# Patient Record
Sex: MALE | Race: WHITE | NOT HISPANIC OR LATINO
[De-identification: names, ages, dates, MRNs, and addresses within clinical notes are randomized per-mention and may not be internally consistent; named-entity substitution may affect disease eponyms.]

---

## 2024-06-12 ENCOUNTER — NON-APPOINTMENT (OUTPATIENT)
Age: 49
End: 2024-06-12

## 2024-06-14 PROBLEM — Z00.00 ENCOUNTER FOR PREVENTIVE HEALTH EXAMINATION: Status: ACTIVE | Noted: 2024-06-14

## 2024-06-17 ENCOUNTER — NON-APPOINTMENT (OUTPATIENT)
Age: 49
End: 2024-06-17

## 2024-06-21 ENCOUNTER — APPOINTMENT (OUTPATIENT)
Dept: ORTHOPEDIC SURGERY | Facility: CLINIC | Age: 49
End: 2024-06-21

## 2024-06-27 ENCOUNTER — TRANSCRIPTION ENCOUNTER (OUTPATIENT)
Age: 49
End: 2024-06-27

## 2024-07-01 ENCOUNTER — TRANSCRIPTION ENCOUNTER (OUTPATIENT)
Age: 49
End: 2024-07-01

## 2024-07-03 ENCOUNTER — APPOINTMENT (OUTPATIENT)
Dept: ORTHOPEDIC SURGERY | Facility: CLINIC | Age: 49
End: 2024-07-03
Payer: OTHER MISCELLANEOUS

## 2024-07-03 DIAGNOSIS — Z78.9 OTHER SPECIFIED HEALTH STATUS: ICD-10-CM

## 2024-07-03 DIAGNOSIS — S83.411A SPRAIN OF MEDIAL COLLATERAL LIGAMENT OF RIGHT KNEE, INITIAL ENCOUNTER: ICD-10-CM

## 2024-07-03 DIAGNOSIS — Z72.3 LACK OF PHYSICAL EXERCISE: ICD-10-CM

## 2024-07-03 PROCEDURE — 99203 OFFICE O/P NEW LOW 30 MIN: CPT

## 2024-07-24 ENCOUNTER — APPOINTMENT (OUTPATIENT)
Dept: ORTHOPEDIC SURGERY | Facility: CLINIC | Age: 49
End: 2024-07-24
Payer: OTHER MISCELLANEOUS

## 2024-07-24 DIAGNOSIS — S83.411A SPRAIN OF MEDIAL COLLATERAL LIGAMENT OF RIGHT KNEE, INITIAL ENCOUNTER: ICD-10-CM

## 2024-07-24 PROCEDURE — 99213 OFFICE O/P EST LOW 20 MIN: CPT

## 2024-07-24 NOTE — HISTORY OF PRESENT ILLNESS
[de-identified] :  Mr. Rubio is a 48 year old male who comes in for f/u evaluation for RIGHT knee injury that occurred June 4, 2024.  His exam was consistent with an MCL sprain. He states that his knee is feeling progressively better.  He has done about 4 sessions of physical therapy.  He has been working the last couple weeks but light duty to avoid aggravating the knee.  His knee feels fine walking straight ahead and biking.  He does get brief pains up to 5 out of 10 if he twists on the knee.  He has not been taking any medication.  No swelling or locking or buckling.

## 2024-07-24 NOTE — PHYSICAL EXAM
[LE] : Sensory: Intact in bilateral lower extremities [Normal RLE] : Right Lower Extremity: No scars, rashes, lesions, ulcers, skin intact [Normal LLE] : Left Lower Extremity: No scars, rashes, lesions, ulcers, skin intact [Normal Touch] : sensation intact for touch [Normal] : Oriented to person, place, and time, insight and judgement were intact and the affect was normal [de-identified] : RIGHT knee Normal gait.  He can squat almost fully but the right knee does not flex to quite as much as the left and he has some discomfort on deep flexion No edema, ecchymoses, erythema, effusion. No deformity.  Mild varus of the knees Knee range of motion is from 0 to 130  degrees in the right knee versus 140 degrees left knee with some tightness on the right on full flexion and discomfort Minimally tender along the MCL.  No joint line tenderness or patella tenderness Ia Lachman.  Negative anterior and posterior drawer.  Normal varus laxity.  No increased valgus laxity but has medial pain with valgus stress.  Normal rotational laxity. Intact extensor mechanism. Negative Gage. Normal neurovascular exam [de-identified] :  XR KNEE COMPLETE 4 VIEWS RIGHT 6/21/2024  Clinical History: Pain AP, lateral and oblique view of the right knee nonweightbearing FINDINGS: There is no fracture, dislocation, soft tissue swelling or joint effusion. Mild narrowing of the medial joint space. Small enthesopathy superior pole of patella No radiopaque foreign body. IMPRESSION: 1. No fracture. There is patella baja.  Mild joint space narrowing without any significant productive changes.  No merchant view is present.

## 2024-07-24 NOTE — PHYSICAL EXAM
[LE] : Sensory: Intact in bilateral lower extremities [Normal RLE] : Right Lower Extremity: No scars, rashes, lesions, ulcers, skin intact [Normal LLE] : Left Lower Extremity: No scars, rashes, lesions, ulcers, skin intact [Normal Touch] : sensation intact for touch [Normal] : Oriented to person, place, and time, insight and judgement were intact and the affect was normal [de-identified] : RIGHT knee Normal gait.  He can squat almost fully but the right knee does not flex to quite as much as the left and he has some discomfort on deep flexion No edema, ecchymoses, erythema, effusion. No deformity.  Mild varus of the knees Knee range of motion is from 0 to 130  degrees in the right knee versus 140 degrees left knee with some tightness on the right on full flexion and discomfort Minimally tender along the MCL.  No joint line tenderness or patella tenderness Ia Lachman.  Negative anterior and posterior drawer.  Normal varus laxity.  No increased valgus laxity but has medial pain with valgus stress.  Normal rotational laxity. Intact extensor mechanism. Negative Gage. Normal neurovascular exam [de-identified] :  XR KNEE COMPLETE 4 VIEWS RIGHT 6/21/2024  Clinical History: Pain AP, lateral and oblique view of the right knee nonweightbearing FINDINGS: There is no fracture, dislocation, soft tissue swelling or joint effusion. Mild narrowing of the medial joint space. Small enthesopathy superior pole of patella No radiopaque foreign body. IMPRESSION: 1. No fracture. There is patella baja.  Mild joint space narrowing without any significant productive changes.  No merchant view is present.

## 2024-07-24 NOTE — HISTORY OF PRESENT ILLNESS
[de-identified] :  Mr. Rubio is a 48 year old male who comes in for f/u evaluation for RIGHT knee injury that occurred June 4, 2024.  His exam was consistent with an MCL sprain. He states that his knee is feeling progressively better.  He has done about 4 sessions of physical therapy.  He has been working the last couple weeks but light duty to avoid aggravating the knee.  His knee feels fine walking straight ahead and biking.  He does get brief pains up to 5 out of 10 if he twists on the knee.  He has not been taking any medication.  No swelling or locking or buckling.

## 2024-07-24 NOTE — ASSESSMENT
[FreeTextEntry1] : 48-year-old who injured his right knee 8 wks ago at work when he pivoted or twisted.  By exam he had an MCL sprain that is healing.  He is getting back good motion and there is no swelling.  Pain is decreasing. I cannot completely exclude meniscus tear but given his exam I would not recommend surgery even if there was a meniscus tear at this time.  I would recommend doing more therapy and giving it more time for the MCL to heal. He should do more physical therapy which is already authorized.  He should do the home exercises.  Avoid twisting or painful activities and no sports yet.  Continue to work light duty. He can lift up to 30 pounds over short distance but no significant heavy lifting and no deep bending or twisting or crawling/kneeling. He can take NSAIDs as needed, ibuprofen.  Ice and heat. There is 50% temporary impairment. Follow-up in 3 to 4 weeks.

## 2024-08-06 NOTE — ASSESSMENT
[FreeTextEntry1] : 48-year-old who injured his right knee 11 wks ago at work when he pivoted or twisted.  By exam he had an MCL sprain that is healing.  He is getting back good motion and there is no swelling.  Pain is decreasing.

## 2024-08-06 NOTE — HISTORY OF PRESENT ILLNESS
[de-identified] :  Mr. Rubio is a 48 year old male who comes in for f/u evaluation for RIGHT knee injury that occurred June 4, 2024.  His exam was consistent with an MCL sprain. He states that his knee is feeling

## 2024-08-06 NOTE — PHYSICAL EXAM
[LE] : Sensory: Intact in bilateral lower extremities [Normal RLE] : Right Lower Extremity: No scars, rashes, lesions, ulcers, skin intact [Normal LLE] : Left Lower Extremity: No scars, rashes, lesions, ulcers, skin intact [Normal Touch] : sensation intact for touch [Normal] : Oriented to person, place, and time, insight and judgement were intact and the affect was normal [de-identified] : RIGHT knee Normal gait.  He can squat almost fully but the right knee does not flex to quite as much as the left and he has some discomfort on deep flexion No edema, ecchymoses, erythema, effusion. No deformity.  Mild varus of the knees Knee range of motion is from 0 to 130  degrees in the right knee versus 140 degrees left knee with some tightness on the right on full flexion and discomfort Minimally tender along the MCL.  No joint line tenderness or patella tenderness Ia Lachman.  Negative anterior and posterior drawer.  Normal varus laxity.  No increased valgus laxity but has medial pain with valgus stress.  Normal rotational laxity. Intact extensor mechanism. Negative Gage. Normal neurovascular exam [de-identified] :  XR KNEE COMPLETE 4 VIEWS RIGHT 6/21/2024  Clinical History: Pain AP, lateral and oblique view of the right knee nonweightbearing FINDINGS: There is no fracture, dislocation, soft tissue swelling or joint effusion. Mild narrowing of the medial joint space. Small enthesopathy superior pole of patella No radiopaque foreign body. IMPRESSION: 1. No fracture. There is patella baja.  Mild joint space narrowing without any significant productive changes.  No merchant view is present.

## 2024-08-15 ENCOUNTER — APPOINTMENT (OUTPATIENT)
Dept: ORTHOPEDIC SURGERY | Facility: CLINIC | Age: 49
End: 2024-08-15
Payer: OTHER MISCELLANEOUS

## 2024-08-15 ENCOUNTER — NON-APPOINTMENT (OUTPATIENT)
Age: 49
End: 2024-08-15

## 2024-08-15 DIAGNOSIS — S83.411A SPRAIN OF MEDIAL COLLATERAL LIGAMENT OF RIGHT KNEE, INITIAL ENCOUNTER: ICD-10-CM

## 2024-08-15 PROCEDURE — 99213 OFFICE O/P EST LOW 20 MIN: CPT

## 2024-08-15 NOTE — PHYSICAL EXAM
[de-identified] : RIGHT knee Normal gait.  He can squat almost fully but the right knee does not flex to quite as much as the left and he has some discomfort on deep flexion No edema, ecchymoses, erythema, effusion. No deformity.  Mild varus of the knees Knee range of motion is from 0 to 135  degrees in the right knee versus 140 degrees left knee with mild discomfort/tightness on the right on full flexion and discomfort Nontender along the MCL.  No joint line tenderness or patella tenderness Ia Lachman.  Negative anterior and posterior drawer.  Normal varus laxity.  No increased valgus laxity but has medial pain with valgus stress.  Normal rotational laxity. Intact extensor mechanism. Negative Gage. Normal neurovascular exam [de-identified] :  XR KNEE COMPLETE 4 VIEWS RIGHT 6/21/2024  Clinical History: Pain AP, lateral and oblique view of the right knee nonweightbearing FINDINGS: There is no fracture, dislocation, soft tissue swelling or joint effusion. Mild narrowing of the medial joint space. Small enthesopathy superior pole of patella No radiopaque foreign body. IMPRESSION: 1. No fracture. There is patella baja.  Mild joint space narrowing without any significant productive changes.  No merchant view is present.

## 2024-08-15 NOTE — PHYSICAL EXAM
[de-identified] : RIGHT knee Normal gait.  He can squat almost fully but the right knee does not flex to quite as much as the left and he has some discomfort on deep flexion No edema, ecchymoses, erythema, effusion. No deformity.  Mild varus of the knees Knee range of motion is from 0 to 135  degrees in the right knee versus 140 degrees left knee with mild discomfort/tightness on the right on full flexion and discomfort Nontender along the MCL.  No joint line tenderness or patella tenderness Ia Lachman.  Negative anterior and posterior drawer.  Normal varus laxity.  No increased valgus laxity but has medial pain with valgus stress.  Normal rotational laxity. Intact extensor mechanism. Negative Gage. Normal neurovascular exam [de-identified] :  XR KNEE COMPLETE 4 VIEWS RIGHT 6/21/2024  Clinical History: Pain AP, lateral and oblique view of the right knee nonweightbearing FINDINGS: There is no fracture, dislocation, soft tissue swelling or joint effusion. Mild narrowing of the medial joint space. Small enthesopathy superior pole of patella No radiopaque foreign body. IMPRESSION: 1. No fracture. There is patella baja.  Mild joint space narrowing without any significant productive changes.  No merchant view is present.

## 2024-08-15 NOTE — HISTORY OF PRESENT ILLNESS
[de-identified] :  Mr. Rubio is a 48 year old male who comes in for f/u evaluation for RIGHT knee injury that occurred June 4, 2024.  His exam was consistent with an MCL sprain. He states that his knee is feeling 95% better but there is still some lingering stiffness and discomfort. He has done more PT. He is not taking any medication for it.  No swelling or locking or buckling.  Occasionally there is clicking. He is still doing therapy.  He will be away for a couple weeks and then resume when he gets back. He is working but somewhat light duty at this point in time

## 2024-08-15 NOTE — ASSESSMENT
[FreeTextEntry1] : 48-year-old who injured his right knee about 2 1/2 mos ago at work.  By exam he had an MCL sprain that is healing.  He is getting back good motion and there is no swelling.  Pain is decreasing. He feels that he is about 95% better but there is lingering pain.  His job is very physically demanding being on his feet and doing lifting and carrying.  He is working but avoiding any heavy lifting more than 30 pounds in taking it a little easier than usual. He will continue with home exercises and physical therapy.  Ice as needed.  NSAIDs if needed. I thought his exam initially was most consistent with MCL sprain.  I cannot totally exclude a meniscus tear and if he has lingering pain we would get an MRI for that but we both agreed today that he has made very good progress and not needed at this time. We would not consider surgical treatment even if there was a small tear of the meniscus. If he has ongoing pain I would get the MRI. There is 25% persistent temporary impairment of the right knee. He may work on a modified basis as stated above. Follow-up in about 6 to 8 weeks.

## 2024-08-15 NOTE — HISTORY OF PRESENT ILLNESS
[de-identified] :  Mr. Rubio is a 48 year old male who comes in for f/u evaluation for RIGHT knee injury that occurred June 4, 2024.  His exam was consistent with an MCL sprain. He states that his knee is feeling 95% better but there is still some lingering stiffness and discomfort. He has done more PT. He is not taking any medication for it.  No swelling or locking or buckling.  Occasionally there is clicking. He is still doing therapy.  He will be away for a couple weeks and then resume when he gets back. He is working but somewhat light duty at this point in time

## 2024-09-26 ENCOUNTER — APPOINTMENT (OUTPATIENT)
Dept: ORTHOPEDIC SURGERY | Facility: CLINIC | Age: 49
End: 2024-09-26
Payer: OTHER MISCELLANEOUS

## 2024-09-26 DIAGNOSIS — S83.411A SPRAIN OF MEDIAL COLLATERAL LIGAMENT OF RIGHT KNEE, INITIAL ENCOUNTER: ICD-10-CM

## 2024-09-26 PROCEDURE — 99213 OFFICE O/P EST LOW 20 MIN: CPT

## 2024-09-26 NOTE — HISTORY OF PRESENT ILLNESS
[de-identified] :  Mr. Rubio is a 49 year old male who comes in for f/u evaluation for RIGHT knee injury that occurred June 4, 2024.  His exam was consistent with an MCL sprain. He states that his knee is feeling better but still not at 100%. He is back at work doing most duties. He tries to avoid aggravating things and putting too much stress on RIGHT leg.  He did PT and home exercises. He has not had PT in the past couple weeks as visits were cut off. [place] away did 25k steps a day with no significant issues. He is ok with level ground walking. He gets occasional throbbing and will use his knee brace as needed but not all the time.  He does not take pain medications.  No swelling or locking or buckling but he still gets twinges of pain and is not back to his baseline

## 2024-09-26 NOTE — PHYSICAL EXAM
[LE] : Sensory: Intact in bilateral lower extremities [Normal RLE] : Right Lower Extremity: No scars, rashes, lesions, ulcers, skin intact [Normal LLE] : Left Lower Extremity: No scars, rashes, lesions, ulcers, skin intact [Normal Touch] : sensation intact for touch [Normal] : Oriented to person, place, and time, insight and judgement were intact and the affect was normal [de-identified] : RIGHT knee Normal gait.  He can squat almost fully but the right knee does not flex to quite as much as the left and he has some discomfort on deep flexion No edema, ecchymoses, erythema, effusion. No deformity.  Mild varus of the knees Knee range of motion is from 0 to 135  degrees in the right knee versus 140 degrees left knee with mild discomfort/tightness on the right on full flexion and discomfort Nontender along the MCL.  No joint line tenderness or patella tenderness Ia Lachman.  Negative anterior and posterior drawer.  Normal varus laxity.  No increased valgus laxity but has medial pain with valgus stress.  Normal rotational laxity. Intact extensor mechanism. Negative Gage. Normal neurovascular exam [de-identified] :  XR KNEE COMPLETE 4 VIEWS RIGHT 6/21/2024  Clinical History: Pain AP, lateral and oblique view of the right knee nonweightbearing FINDINGS: There is no fracture, dislocation, soft tissue swelling or joint effusion. Mild narrowing of the medial joint space. Small enthesopathy superior pole of patella No radiopaque foreign body. IMPRESSION: 1. No fracture. There is patella baja.  Mild joint space narrowing without any significant productive changes.  No merchant view is present.

## 2024-09-26 NOTE — ASSESSMENT
[FreeTextEntry1] : 49-year-old who injured his right knee almost 4 months ago at work when he pivoted or twisted.  His exam seem most consistent with a mild MCL sprain when I saw him.  I would have expected him to fully recover by now.  His knee has improved a lot but there is still some discomfort and intermittent pain and there could be a medial meniscus tear causing these ongoing types of symptoms.  Therefore I referred him for an MRI just so we know if there is any other injury in the joint associated with the trauma 4 months ago.  I will call him with results.  In the meantime he should continue with the exercises he learned in therapy.  He may work full duty and we will modify how he lifts and carries as needed. There is 10% loss of use at this time. Follow-up 6 weeks but I will call him with MRI results sooner if I get the

## 2024-09-26 NOTE — HISTORY OF PRESENT ILLNESS
[de-identified] :  Mr. Rubio is a 49 year old male who comes in for f/u evaluation for RIGHT knee injury that occurred June 4, 2024.  His exam was consistent with an MCL sprain. He states that his knee is feeling better but still not at 100%. He is back at work doing most duties. He tries to avoid aggravating things and putting too much stress on RIGHT leg.  He did PT and home exercises. He has not had PT in the past couple weeks as visits were cut off. [place] away did 25k steps a day with no significant issues. He is ok with level ground walking. He gets occasional throbbing and will use his knee brace as needed but not all the time.  He does not take pain medications.  No swelling or locking or buckling but he still gets twinges of pain and is not back to his baseline

## 2024-09-26 NOTE — PHYSICAL EXAM
[LE] : Sensory: Intact in bilateral lower extremities [Normal RLE] : Right Lower Extremity: No scars, rashes, lesions, ulcers, skin intact [Normal LLE] : Left Lower Extremity: No scars, rashes, lesions, ulcers, skin intact [Normal Touch] : sensation intact for touch [Normal] : Oriented to person, place, and time, insight and judgement were intact and the affect was normal [de-identified] : RIGHT knee Normal gait.  He can squat almost fully but the right knee does not flex to quite as much as the left and he has some discomfort on deep flexion No edema, ecchymoses, erythema, effusion. No deformity.  Mild varus of the knees Knee range of motion is from 0 to 135  degrees in the right knee versus 140 degrees left knee with mild discomfort/tightness on the right on full flexion and discomfort Nontender along the MCL.  No joint line tenderness or patella tenderness Ia Lachman.  Negative anterior and posterior drawer.  Normal varus laxity.  No increased valgus laxity but has medial pain with valgus stress.  Normal rotational laxity. Intact extensor mechanism. Negative Gage. Normal neurovascular exam [de-identified] :  XR KNEE COMPLETE 4 VIEWS RIGHT 6/21/2024  Clinical History: Pain AP, lateral and oblique view of the right knee nonweightbearing FINDINGS: There is no fracture, dislocation, soft tissue swelling or joint effusion. Mild narrowing of the medial joint space. Small enthesopathy superior pole of patella No radiopaque foreign body. IMPRESSION: 1. No fracture. There is patella baja.  Mild joint space narrowing without any significant productive changes.  No merchant view is present.

## 2024-11-04 ENCOUNTER — APPOINTMENT (OUTPATIENT)
Dept: ORTHOPEDIC SURGERY | Facility: CLINIC | Age: 49
End: 2024-11-04

## 2024-11-12 DIAGNOSIS — M25.861 OTHER SPECIFIED JOINT DISORDERS, RIGHT KNEE: ICD-10-CM

## 2024-11-12 DIAGNOSIS — S83.519A SPRAIN OF ANTERIOR CRUCIATE LIGAMENT OF UNSPECIFIED KNEE, INITIAL ENCOUNTER: ICD-10-CM

## 2024-11-12 DIAGNOSIS — S83.411D SPRAIN OF MEDIAL COLLATERAL LIGAMENT OF RIGHT KNEE, SUBSEQUENT ENCOUNTER: ICD-10-CM

## 2024-11-12 DIAGNOSIS — S83.241A OTHER TEAR OF MEDIAL MENISCUS, CURRENT INJURY, RIGHT KNEE, INITIAL ENCOUNTER: ICD-10-CM

## 2024-11-25 ENCOUNTER — APPOINTMENT (OUTPATIENT)
Dept: ORTHOPEDIC SURGERY | Facility: CLINIC | Age: 49
End: 2024-11-25
Payer: OTHER MISCELLANEOUS

## 2024-11-25 DIAGNOSIS — S83.411D SPRAIN OF MEDIAL COLLATERAL LIGAMENT OF RIGHT KNEE, SUBSEQUENT ENCOUNTER: ICD-10-CM

## 2024-11-25 DIAGNOSIS — M25.861 OTHER SPECIFIED JOINT DISORDERS, RIGHT KNEE: ICD-10-CM

## 2024-11-25 DIAGNOSIS — S83.519A SPRAIN OF ANTERIOR CRUCIATE LIGAMENT OF UNSPECIFIED KNEE, INITIAL ENCOUNTER: ICD-10-CM

## 2024-11-25 DIAGNOSIS — S83.241A OTHER TEAR OF MEDIAL MENISCUS, CURRENT INJURY, RIGHT KNEE, INITIAL ENCOUNTER: ICD-10-CM

## 2024-11-25 PROCEDURE — 99214 OFFICE O/P EST MOD 30 MIN: CPT

## 2025-01-13 ENCOUNTER — APPOINTMENT (OUTPATIENT)
Dept: ORTHOPEDIC SURGERY | Facility: CLINIC | Age: 50
End: 2025-01-13

## 2025-04-11 ENCOUNTER — APPOINTMENT (OUTPATIENT)
Dept: PHYSICAL MEDICINE AND REHAB | Facility: CLINIC | Age: 50
End: 2025-04-11